# Patient Record
Sex: MALE | Race: WHITE | NOT HISPANIC OR LATINO | URBAN - METROPOLITAN AREA
[De-identification: names, ages, dates, MRNs, and addresses within clinical notes are randomized per-mention and may not be internally consistent; named-entity substitution may affect disease eponyms.]

---

## 2021-05-05 ENCOUNTER — OUTPATIENT (OUTPATIENT)
Dept: OUTPATIENT SERVICES | Facility: HOSPITAL | Age: 61
LOS: 1 days | Discharge: HOME | End: 2021-05-05
Payer: MEDICAID

## 2021-05-05 DIAGNOSIS — R51.9 HEADACHE, UNSPECIFIED: ICD-10-CM

## 2021-05-05 DIAGNOSIS — M54.2 CERVICALGIA: ICD-10-CM

## 2021-05-05 PROCEDURE — 72148 MRI LUMBAR SPINE W/O DYE: CPT | Mod: 26

## 2021-05-05 PROCEDURE — 70551 MRI BRAIN STEM W/O DYE: CPT | Mod: 26

## 2022-01-25 ENCOUNTER — OUTPATIENT (OUTPATIENT)
Dept: OUTPATIENT SERVICES | Facility: HOSPITAL | Age: 62
LOS: 1 days | Discharge: HOME | End: 2022-01-25
Payer: MEDICAID

## 2022-01-25 ENCOUNTER — RESULT REVIEW (OUTPATIENT)
Age: 62
End: 2022-01-25

## 2022-01-25 VITALS
OXYGEN SATURATION: 99 % | WEIGHT: 180.56 LBS | SYSTOLIC BLOOD PRESSURE: 118 MMHG | DIASTOLIC BLOOD PRESSURE: 78 MMHG | HEART RATE: 74 BPM | RESPIRATION RATE: 15 BRPM | HEIGHT: 66 IN | TEMPERATURE: 98 F

## 2022-01-25 DIAGNOSIS — M16.12 UNILATERAL PRIMARY OSTEOARTHRITIS, LEFT HIP: ICD-10-CM

## 2022-01-25 DIAGNOSIS — Z01.818 ENCOUNTER FOR OTHER PREPROCEDURAL EXAMINATION: ICD-10-CM

## 2022-01-25 LAB
A1C WITH ESTIMATED AVERAGE GLUCOSE RESULT: 6.3 % — HIGH (ref 4–5.6)
ALBUMIN SERPL ELPH-MCNC: 4.7 G/DL — SIGNIFICANT CHANGE UP (ref 3.5–5.2)
ALP SERPL-CCNC: 79 U/L — SIGNIFICANT CHANGE UP (ref 30–115)
ALT FLD-CCNC: 29 U/L — SIGNIFICANT CHANGE UP (ref 0–41)
ANION GAP SERPL CALC-SCNC: 12 MMOL/L — SIGNIFICANT CHANGE UP (ref 7–14)
APTT BLD: 34.4 SEC — SIGNIFICANT CHANGE UP (ref 27–39.2)
AST SERPL-CCNC: 25 U/L — SIGNIFICANT CHANGE UP (ref 0–41)
BASOPHILS # BLD AUTO: 0.04 K/UL — SIGNIFICANT CHANGE UP (ref 0–0.2)
BASOPHILS NFR BLD AUTO: 0.8 % — SIGNIFICANT CHANGE UP (ref 0–1)
BILIRUB SERPL-MCNC: 0.5 MG/DL — SIGNIFICANT CHANGE UP (ref 0.2–1.2)
BLD GP AB SCN SERPL QL: SIGNIFICANT CHANGE UP
BUN SERPL-MCNC: 17 MG/DL — SIGNIFICANT CHANGE UP (ref 10–20)
CALCIUM SERPL-MCNC: 9.4 MG/DL — SIGNIFICANT CHANGE UP (ref 8.5–10.1)
CHLORIDE SERPL-SCNC: 105 MMOL/L — SIGNIFICANT CHANGE UP (ref 98–110)
CO2 SERPL-SCNC: 23 MMOL/L — SIGNIFICANT CHANGE UP (ref 17–32)
CREAT SERPL-MCNC: 0.9 MG/DL — SIGNIFICANT CHANGE UP (ref 0.7–1.5)
EOSINOPHIL # BLD AUTO: 0.14 K/UL — SIGNIFICANT CHANGE UP (ref 0–0.7)
EOSINOPHIL NFR BLD AUTO: 3 % — SIGNIFICANT CHANGE UP (ref 0–8)
ESTIMATED AVERAGE GLUCOSE: 134 MG/DL — HIGH (ref 68–114)
GLUCOSE SERPL-MCNC: 116 MG/DL — HIGH (ref 70–99)
HCT VFR BLD CALC: 47.5 % — SIGNIFICANT CHANGE UP (ref 42–52)
HGB BLD-MCNC: 16 G/DL — SIGNIFICANT CHANGE UP (ref 14–18)
IMM GRANULOCYTES NFR BLD AUTO: 0.2 % — SIGNIFICANT CHANGE UP (ref 0.1–0.3)
INR BLD: 0.95 RATIO — SIGNIFICANT CHANGE UP (ref 0.65–1.3)
LYMPHOCYTES # BLD AUTO: 1.47 K/UL — SIGNIFICANT CHANGE UP (ref 1.2–3.4)
LYMPHOCYTES # BLD AUTO: 31.1 % — SIGNIFICANT CHANGE UP (ref 20.5–51.1)
MCHC RBC-ENTMCNC: 29.9 PG — SIGNIFICANT CHANGE UP (ref 27–31)
MCHC RBC-ENTMCNC: 33.7 G/DL — SIGNIFICANT CHANGE UP (ref 32–37)
MCV RBC AUTO: 88.8 FL — SIGNIFICANT CHANGE UP (ref 80–94)
MONOCYTES # BLD AUTO: 0.39 K/UL — SIGNIFICANT CHANGE UP (ref 0.1–0.6)
MONOCYTES NFR BLD AUTO: 8.2 % — SIGNIFICANT CHANGE UP (ref 1.7–9.3)
MRSA PCR RESULT.: NEGATIVE — SIGNIFICANT CHANGE UP
NEUTROPHILS # BLD AUTO: 2.68 K/UL — SIGNIFICANT CHANGE UP (ref 1.4–6.5)
NEUTROPHILS NFR BLD AUTO: 56.7 % — SIGNIFICANT CHANGE UP (ref 42.2–75.2)
NRBC # BLD: 0 /100 WBCS — SIGNIFICANT CHANGE UP (ref 0–0)
PLATELET # BLD AUTO: 230 K/UL — SIGNIFICANT CHANGE UP (ref 130–400)
POTASSIUM SERPL-MCNC: 4.8 MMOL/L — SIGNIFICANT CHANGE UP (ref 3.5–5)
POTASSIUM SERPL-SCNC: 4.8 MMOL/L — SIGNIFICANT CHANGE UP (ref 3.5–5)
PROT SERPL-MCNC: 7.6 G/DL — SIGNIFICANT CHANGE UP (ref 6–8)
PROTHROM AB SERPL-ACNC: 10.9 SEC — SIGNIFICANT CHANGE UP (ref 9.95–12.87)
RBC # BLD: 5.35 M/UL — SIGNIFICANT CHANGE UP (ref 4.7–6.1)
RBC # FLD: 13.1 % — SIGNIFICANT CHANGE UP (ref 11.5–14.5)
SODIUM SERPL-SCNC: 140 MMOL/L — SIGNIFICANT CHANGE UP (ref 135–146)
WBC # BLD: 4.73 K/UL — LOW (ref 4.8–10.8)
WBC # FLD AUTO: 4.73 K/UL — LOW (ref 4.8–10.8)

## 2022-01-25 PROCEDURE — 93010 ELECTROCARDIOGRAM REPORT: CPT

## 2022-01-25 PROCEDURE — 71046 X-RAY EXAM CHEST 2 VIEWS: CPT | Mod: 26

## 2022-01-25 PROCEDURE — 73502 X-RAY EXAM HIP UNI 2-3 VIEWS: CPT | Mod: 26,LT

## 2022-01-25 NOTE — H&P PST ADULT - HISTORY OF PRESENT ILLNESS
PT PRESENTS TO PAST WITH NO SOB, CP, PALPITATIONS, DYSURIA, UTI OR URI AT PRESENT.   PT ABLE TO WALK UP 2-3 FLIGHTS OF STEPS WITH NO SOB-VERY SLOW.   AS PER THE PT, THIS IS HIS/HER COMPLETE MEDICAL AND SURGICAL HX, INCLUDING MEDICATIONS PRESCRIBED AND OVER THE COUNTER  pt denies any covid s/s, YES- tested positive for covid  past- 12/21.     PT IS AWARE OF DATE AND TIME OF COVID TESTING PRIOR TO SURGERY.   pt advised self quarantine till day of procedure  denies travel outside the [place] in the past 30 days  Anesthesia Alert  NO--Difficult Airway  NO--History of neck surgery or radiation  NO--Limited ROM of neck  NO--History of Malignant hyperthermia  NO--Personal or family history of Pseudocholinesterase deficiency  NO--Prior Anesthesia Complication  NO--Latex Allergy  NO--Loose teeth  NO--History of Rheumatoid Arthritis  NO--THAD  NO BLEEDING RISK  NO--Other_____

## 2022-01-25 NOTE — H&P PST ADULT - REASON FOR ADMISSION
Patient is a  61  year old     male presenting to PAST in preparation for   left total hip replacement  on  2/11/22    under  regional anesthesia by Dr. Bunny arriaga.  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 THE PAIN HAS BEEN INCREASING RECENTLY.    THE PAIN IS A 7/10 IN MY LEFT HIP.  THE PAIN IS A BURNING, THROBBING AND RADIATING PAIN. THE PAIN RADIATES AROUND MY LEFT HIP.  I HAVE TO REST FOR THE PAIN TO GO AWAY.

## 2022-02-11 ENCOUNTER — INPATIENT (INPATIENT)
Facility: HOSPITAL | Age: 62
LOS: 0 days | Discharge: HOME HEALTH | End: 2022-02-12
Attending: ORTHOPAEDIC SURGERY | Admitting: ORTHOPAEDIC SURGERY
Payer: MEDICAID

## 2022-02-11 ENCOUNTER — RESULT REVIEW (OUTPATIENT)
Age: 62
End: 2022-02-11

## 2022-02-11 ENCOUNTER — TRANSCRIPTION ENCOUNTER (OUTPATIENT)
Age: 62
End: 2022-02-11

## 2022-02-11 VITALS
HEIGHT: 66 IN | SYSTOLIC BLOOD PRESSURE: 153 MMHG | TEMPERATURE: 98 F | HEART RATE: 80 BPM | OXYGEN SATURATION: 96 % | RESPIRATION RATE: 17 BRPM | DIASTOLIC BLOOD PRESSURE: 96 MMHG | WEIGHT: 179.9 LBS

## 2022-02-11 LAB
GLUCOSE BLDC GLUCOMTR-MCNC: 121 MG/DL — HIGH (ref 70–99)
GLUCOSE BLDC GLUCOMTR-MCNC: 99 MG/DL — SIGNIFICANT CHANGE UP (ref 70–99)

## 2022-02-11 PROCEDURE — 88311 DECALCIFY TISSUE: CPT | Mod: 26

## 2022-02-11 PROCEDURE — 88304 TISSUE EXAM BY PATHOLOGIST: CPT | Mod: 26

## 2022-02-11 PROCEDURE — 73501 X-RAY EXAM HIP UNI 1 VIEW: CPT | Mod: 26,LT

## 2022-02-11 RX ORDER — PANTOPRAZOLE SODIUM 20 MG/1
40 TABLET, DELAYED RELEASE ORAL
Refills: 0 | Status: DISCONTINUED | OUTPATIENT
Start: 2022-02-11 | End: 2022-02-12

## 2022-02-11 RX ORDER — ASPIRIN/CALCIUM CARB/MAGNESIUM 324 MG
81 TABLET ORAL EVERY 12 HOURS
Refills: 0 | Status: DISCONTINUED | OUTPATIENT
Start: 2022-02-11 | End: 2022-02-12

## 2022-02-11 RX ORDER — ACETAMINOPHEN 500 MG
2 TABLET ORAL
Qty: 96 | Refills: 0
Start: 2022-02-11 | End: 2022-02-22

## 2022-02-11 RX ORDER — PANTOPRAZOLE SODIUM 20 MG/1
1 TABLET, DELAYED RELEASE ORAL
Qty: 30 | Refills: 0
Start: 2022-02-11 | End: 2022-03-12

## 2022-02-11 RX ORDER — TRAMADOL HYDROCHLORIDE 50 MG/1
1 TABLET ORAL
Qty: 20 | Refills: 0
Start: 2022-02-11 | End: 2022-02-15

## 2022-02-11 RX ORDER — CELECOXIB 200 MG/1
1 CAPSULE ORAL
Qty: 28 | Refills: 0
Start: 2022-02-11 | End: 2022-02-24

## 2022-02-11 RX ORDER — ONDANSETRON 8 MG/1
4 TABLET, FILM COATED ORAL ONCE
Refills: 0 | Status: DISCONTINUED | OUTPATIENT
Start: 2022-02-11 | End: 2022-02-11

## 2022-02-11 RX ORDER — CELECOXIB 200 MG/1
400 CAPSULE ORAL ONCE
Refills: 0 | Status: COMPLETED | OUTPATIENT
Start: 2022-02-11 | End: 2022-02-11

## 2022-02-11 RX ORDER — OXYCODONE HYDROCHLORIDE 5 MG/1
1 TABLET ORAL
Qty: 12 | Refills: 0
Start: 2022-02-11 | End: 2022-02-14

## 2022-02-11 RX ORDER — SODIUM CHLORIDE 9 MG/ML
1000 INJECTION INTRAMUSCULAR; INTRAVENOUS; SUBCUTANEOUS
Refills: 0 | Status: DISCONTINUED | OUTPATIENT
Start: 2022-02-12 | End: 2022-02-12

## 2022-02-11 RX ORDER — POLYETHYLENE GLYCOL 3350 17 G/17G
17 POWDER, FOR SOLUTION ORAL AT BEDTIME
Refills: 0 | Status: DISCONTINUED | OUTPATIENT
Start: 2022-02-11 | End: 2022-02-12

## 2022-02-11 RX ORDER — HYDROMORPHONE HYDROCHLORIDE 2 MG/ML
0.5 INJECTION INTRAMUSCULAR; INTRAVENOUS; SUBCUTANEOUS
Refills: 0 | Status: DISCONTINUED | OUTPATIENT
Start: 2022-02-11 | End: 2022-02-11

## 2022-02-11 RX ORDER — SENNA PLUS 8.6 MG/1
2 TABLET ORAL AT BEDTIME
Refills: 0 | Status: DISCONTINUED | OUTPATIENT
Start: 2022-02-11 | End: 2022-02-12

## 2022-02-11 RX ORDER — SODIUM CHLORIDE 9 MG/ML
1000 INJECTION, SOLUTION INTRAVENOUS
Refills: 0 | Status: DISCONTINUED | OUTPATIENT
Start: 2022-02-11 | End: 2022-02-11

## 2022-02-11 RX ORDER — ASPIRIN/CALCIUM CARB/MAGNESIUM 324 MG
1 TABLET ORAL
Qty: 60 | Refills: 0
Start: 2022-02-11 | End: 2022-03-12

## 2022-02-11 RX ORDER — TRAMADOL HYDROCHLORIDE 50 MG/1
1 TABLET ORAL
Qty: 24 | Refills: 0
Start: 2022-02-11 | End: 2022-02-16

## 2022-02-11 RX ORDER — CEFAZOLIN SODIUM 1 G
2000 VIAL (EA) INJECTION EVERY 8 HOURS
Refills: 0 | Status: COMPLETED | OUTPATIENT
Start: 2022-02-11 | End: 2022-02-12

## 2022-02-11 RX ORDER — TRAMADOL HYDROCHLORIDE 50 MG/1
50 TABLET ORAL EVERY 6 HOURS
Refills: 0 | Status: DISCONTINUED | OUTPATIENT
Start: 2022-02-11 | End: 2022-02-12

## 2022-02-11 RX ORDER — ACETAMINOPHEN 500 MG
1000 TABLET ORAL ONCE
Refills: 0 | Status: COMPLETED | OUTPATIENT
Start: 2022-02-11 | End: 2022-02-11

## 2022-02-11 RX ORDER — KETOROLAC TROMETHAMINE 30 MG/ML
15 SYRINGE (ML) INJECTION EVERY 6 HOURS
Refills: 0 | Status: DISCONTINUED | OUTPATIENT
Start: 2022-02-11 | End: 2022-02-12

## 2022-02-11 RX ORDER — ONDANSETRON 8 MG/1
4 TABLET, FILM COATED ORAL EVERY 6 HOURS
Refills: 0 | Status: DISCONTINUED | OUTPATIENT
Start: 2022-02-11 | End: 2022-02-12

## 2022-02-11 RX ORDER — OXYCODONE HYDROCHLORIDE 5 MG/1
5 TABLET ORAL ONCE
Refills: 0 | Status: DISCONTINUED | OUTPATIENT
Start: 2022-02-11 | End: 2022-02-11

## 2022-02-11 RX ORDER — ACETAMINOPHEN 500 MG
650 TABLET ORAL EVERY 6 HOURS
Refills: 0 | Status: DISCONTINUED | OUTPATIENT
Start: 2022-02-11 | End: 2022-02-12

## 2022-02-11 RX ORDER — CELECOXIB 200 MG/1
200 CAPSULE ORAL EVERY 12 HOURS
Refills: 0 | Status: DISCONTINUED | OUTPATIENT
Start: 2022-02-12 | End: 2022-02-12

## 2022-02-11 RX ORDER — DEXAMETHASONE 0.5 MG/5ML
2 ELIXIR ORAL ONCE
Refills: 0 | Status: COMPLETED | OUTPATIENT
Start: 2022-02-12 | End: 2022-02-12

## 2022-02-11 RX ORDER — MAGNESIUM HYDROXIDE 400 MG/1
30 TABLET, CHEWABLE ORAL DAILY
Refills: 0 | Status: DISCONTINUED | OUTPATIENT
Start: 2022-02-11 | End: 2022-02-12

## 2022-02-11 RX ADMIN — Medication 15 MILLIGRAM(S): at 21:32

## 2022-02-11 RX ADMIN — Medication 100 MILLIGRAM(S): at 21:31

## 2022-02-11 RX ADMIN — SENNA PLUS 2 TABLET(S): 8.6 TABLET ORAL at 21:33

## 2022-02-11 RX ADMIN — SODIUM CHLORIDE 100 MILLILITER(S): 9 INJECTION, SOLUTION INTRAVENOUS at 18:21

## 2022-02-11 RX ADMIN — Medication 650 MILLIGRAM(S): at 21:33

## 2022-02-11 RX ADMIN — Medication 1000 MILLIGRAM(S): at 14:45

## 2022-02-11 RX ADMIN — Medication 15 MILLIGRAM(S): at 22:32

## 2022-02-11 RX ADMIN — CELECOXIB 400 MILLIGRAM(S): 200 CAPSULE ORAL at 14:34

## 2022-02-11 RX ADMIN — POLYETHYLENE GLYCOL 3350 17 GRAM(S): 17 POWDER, FOR SOLUTION ORAL at 21:33

## 2022-02-11 RX ADMIN — Medication 650 MILLIGRAM(S): at 22:32

## 2022-02-11 RX ADMIN — CELECOXIB 400 MILLIGRAM(S): 200 CAPSULE ORAL at 14:45

## 2022-02-11 RX ADMIN — Medication 1000 MILLIGRAM(S): at 14:34

## 2022-02-11 NOTE — DISCHARGE NOTE PROVIDER - NSDCCPGOAL_GEN_ALL_CORE_FT
To get better and follow your care plan as instructed. PT/OT, wbat, continue aspirin 81 mg bid x 30 days for dvt ppx, continue protonix for GI ppx, keep postsurgical dressing 1 week, may shower, f/u as OP with dr Arriaga in 3 weeks

## 2022-02-11 NOTE — ANESTHESIA FOLLOW-UP NOTE - NSRECOMMEND_GEN_ALL_CORE
10/18/2019         RE: Kareem Bai  [address]  [facility] [address]        Dear Colleague,    Thank you for referring your patient, Kareem Bai, to the [facility]. Please see a copy of my visit note below.    Consultation for 63 year old man who has experienced numbness in feet and lower limbs, progressing rostrally, for about 15 years. Numbness in hands upon awakening only. He denies weakness or falls.     PMH, medications, social and family history, and ROS are documented in Dr Solorzano's note and in today's new patient packet.     Denies sicca or history of hepatitis C.     Examination    Mental state: Alert, appropriate, speech, language, and thought content normal.     Cranial nerves II-XII normal.    Sensory examination:   Right Left   Light touch Normal Normal   Vibration (timed)     Vibration (Rydell-Seiffer) MM4 MM3   Pin PC DC   Position     Legend:   MM = medial malleolus, TT = tibial tuberosity, K = patella, MCP = MCP joint  MF = mid-foot, DC = distal calf, MC = mid calf, PC = proximal calf      Manual muscle testing (A indicates atrophy):   Right Left   Shoulder abduction  5 5   Elbow extension 5 5   Elbow flexion 5 5   Wrist extension  5 5   Finger extension 5 5   FDI 5 5   APB 5 5   Hip flexion 5 5   Knee flexion 5 5   Knee extension 5 5   Dorsiflexion 5 5   Plantar flexion 5 5     Muscle tone:   Right Left   Upper limb Normal Normal   Lower limb Normal Normal        Reflexes:   Right Left   Triceps 2 2   Biceps 2 2   Brachioradialis 2 2   Patellar 0 0   Achilles 0 0   Plantar Flexor Flexor   Clonus Absent Absent      Coordination:  Finger-nose normal.  Heel-shin normal.  RRMs normal.    Gait:  Normal. Heel, toe walking normal.     Impression:  1. Reduced pin perception in lower limbs with relative preservation of large fiber modalities.   2. Intrinsic foot muscle atrophy, possibly due to prior spinal stenosis or genetically determined.  "      Recommendations (from AVS):      1. You may have a \"small fiber neuropathy.\" This does not show up on the testing you have had so far. We will test for it with skin biopsies of the foot and calf.  2. I do not think this problem will substantially limit your ability to walk. It may get gradually worse over the years but at worst affects balance modestly and does not cause weakness.  3. Some of the foot muscles are smaller than normal. This could possibly be related to the spinal stenosis or could be hereditary. I do not think we need to do anything about it unless you develop recurrent symptoms similar to those that led to your spinal fusion.   4. Blood test today to screen further for diabetes.      Joaquin Garcia M.D.      Again, thank you for allowing me to participate in the care of your patient.        Sincerely,        Joaquin Garcia MD    " None

## 2022-02-11 NOTE — DISCHARGE NOTE PROVIDER - NSDCACTIVITY_GEN_ALL_CORE
Follow Instructions Provided by your Surgical Team Do not drive or operate machinery/Showering allowed/No heavy lifting/straining/Follow Instructions Provided by your Surgical Team

## 2022-02-11 NOTE — PATIENT PROFILE ADULT - FALL HARM RISK - HARM RISK INTERVENTIONS

## 2022-02-11 NOTE — DISCHARGE NOTE PROVIDER - HOSPITAL COURSE
61 y o M underwent left HENRY, Pt tolerated procedure well. Pt was given intraop and postop abx for infection ppx, pain control meds, DVT/GI ppx. Pt worked wit PT/OT in hospital, cleared for discharge home with home care services.

## 2022-02-11 NOTE — DISCHARGE NOTE PROVIDER - PROVIDER RX CONTACT NUMBER
Received pt PAP meds today . Called and notified pt her PAP meds and pen needles were available to be picked up at the clinic. [phone]

## 2022-02-11 NOTE — DISCHARGE NOTE PROVIDER - CARE PROVIDER_API CALL
Andrea Dumas)  Orthopaedic Surgery  [address] [place] [address]  Phone: [phone]  Fax: [phone]  Follow Up Time:    Andrea Dumas)  Orthopaedic Surgery  [address] [place] [address]  Phone: [phone]  Fax: [phone]  Follow Up Time: 2 weeks

## 2022-02-11 NOTE — CHART NOTE - NSCHARTNOTEFT_GEN_A_CORE
PACU ANESTHESIA ADMISSION NOTE      Procedure:   Post op diagnosis:      ____  Intubated  TV:______       Rate: ______      FiO2: ______    _x__  Patent Airway    __x__  Full return of protective reflexes    _x___  Full recovery from anesthesia / back to baseline     Vitals:   T:  97.7         R:   18               BP:   120/70               Sat:     94              P: 79      Mental Status:  __x__ Awake   __x___ Alert   _____ Drowsy   _____ Sedated    Nausea/Vomiting:  _x___ NO  ______Yes,   See Post - Op Orders          Pain Scale (0-10):  __0___    Treatment: ____ None    ____ See Post - Op/PCA Orders    Post - Operative Fluids:   ____ Oral   __x__ See Post - Op Orders    Plan: Discharge:   ____Home       __x___Floor     _____Critical Care    _____  Other:_________________    Comments:  dc to floor when meets criteria

## 2022-02-11 NOTE — DISCHARGE NOTE PROVIDER - NSDCCPCAREPLAN_GEN_ALL_CORE_FT
PRINCIPAL DISCHARGE DIAGNOSIS  Diagnosis: Status post total hip replacement, left  Assessment and Plan of Treatment:

## 2022-02-11 NOTE — ASU PREOP CHECKLIST - PATIENT SENT TO
-Patient with delayed responses. oriented x 3 but has difficulty remembering things such as medications, PMHx. Likely 2/2 Xanax vs hyponatremia, dehydration.  -No e/o infection aside from leukocytosis (normal diff, downtrending).   -B12, TSH, RPR  -avoid sedating medications operating room

## 2022-02-11 NOTE — ASU PATIENT PROFILE, ADULT - FALL HARM RISK - UNIVERSAL INTERVENTIONS
Bed in lowest position, wheels locked, appropriate side rails in place/Call bell, personal items and telephone in reach/Instruct patient to call for assistance before getting out of bed or chair/Non-slip footwear when patient is out of bed/Ethel to call system/Physically safe environment - no spills, clutter or unnecessary equipment/Purposeful Proactive Rounding/Room/bathroom lighting operational, light cord in reach

## 2022-02-11 NOTE — DISCHARGE NOTE PROVIDER - NSDCMRMEDTOKEN_GEN_ALL_CORE_FT
acetaminophen 325 mg oral tablet: 2 tab(s) orally every 6 hours MDD:8  aspirin 81 mg oral delayed release tablet: 1 tab(s) orally every 12 hours MDD:2  celecoxib 200 mg oral capsule: 1 cap(s) orally every 12 hours MDD:2  oxyCODONE 5 mg oral tablet: 1 tab(s) orally every 8 hours, As Needed -breakthrough  Pain (4 - 6) MDD:3  pantoprazole 40 mg oral delayed release tablet: 1 tab(s) orally once a day (before a meal) MDD:1  traMADol 50 mg oral tablet: 1 tab(s) orally every 6 hours, As needed, severe Pain (1 - 3) MDD:4   acetaminophen 325 mg oral tablet: 2 tab(s) orally every 6 hours MDD:8  aspirin 81 mg oral delayed release tablet: 1 tab(s) orally every 12 hours  celecoxib 200 mg oral capsule: 1 cap(s) orally every 12 hours  pantoprazole 40 mg oral delayed release tablet: 1 tab(s) orally once a day (before a meal)  traMADol 50 mg oral tablet: 1 tab(s) orally every 6 hours, As needed,severe Pain (1 - 3) MDD:4

## 2022-02-12 ENCOUNTER — TRANSCRIPTION ENCOUNTER (OUTPATIENT)
Age: 62
End: 2022-02-12

## 2022-02-12 VITALS — DIASTOLIC BLOOD PRESSURE: 74 MMHG | SYSTOLIC BLOOD PRESSURE: 124 MMHG | TEMPERATURE: 98 F | HEART RATE: 78 BPM

## 2022-02-12 LAB
ANION GAP SERPL CALC-SCNC: 12 MMOL/L — SIGNIFICANT CHANGE UP (ref 7–14)
BUN SERPL-MCNC: 25 MG/DL — HIGH (ref 10–20)
CALCIUM SERPL-MCNC: 9.1 MG/DL — SIGNIFICANT CHANGE UP (ref 8.5–10.1)
CHLORIDE SERPL-SCNC: 103 MMOL/L — SIGNIFICANT CHANGE UP (ref 98–110)
CO2 SERPL-SCNC: 20 MMOL/L — SIGNIFICANT CHANGE UP (ref 17–32)
CREAT SERPL-MCNC: 1 MG/DL — SIGNIFICANT CHANGE UP (ref 0.7–1.5)
GLUCOSE SERPL-MCNC: 106 MG/DL — HIGH (ref 70–99)
HCT VFR BLD CALC: 40.3 % — LOW (ref 42–52)
HGB BLD-MCNC: 13.9 G/DL — LOW (ref 14–18)
MCHC RBC-ENTMCNC: 30.1 PG — SIGNIFICANT CHANGE UP (ref 27–31)
MCHC RBC-ENTMCNC: 34.5 G/DL — SIGNIFICANT CHANGE UP (ref 32–37)
MCV RBC AUTO: 87.2 FL — SIGNIFICANT CHANGE UP (ref 80–94)
NRBC # BLD: 0 /100 WBCS — SIGNIFICANT CHANGE UP (ref 0–0)
PLATELET # BLD AUTO: 213 K/UL — SIGNIFICANT CHANGE UP (ref 130–400)
POTASSIUM SERPL-MCNC: 4.6 MMOL/L — SIGNIFICANT CHANGE UP (ref 3.5–5)
POTASSIUM SERPL-SCNC: 4.6 MMOL/L — SIGNIFICANT CHANGE UP (ref 3.5–5)
RBC # BLD: 4.62 M/UL — LOW (ref 4.7–6.1)
RBC # FLD: 12.8 % — SIGNIFICANT CHANGE UP (ref 11.5–14.5)
SODIUM SERPL-SCNC: 135 MMOL/L — SIGNIFICANT CHANGE UP (ref 135–146)
WBC # BLD: 13.94 K/UL — HIGH (ref 4.8–10.8)
WBC # FLD AUTO: 13.94 K/UL — HIGH (ref 4.8–10.8)

## 2022-02-12 RX ORDER — TRAMADOL HYDROCHLORIDE 50 MG/1
1 TABLET ORAL
Qty: 24 | Refills: 0
Start: 2022-02-12 | End: 2022-02-17

## 2022-02-12 RX ORDER — CELECOXIB 200 MG/1
1 CAPSULE ORAL
Qty: 28 | Refills: 0
Start: 2022-02-12 | End: 2022-02-25

## 2022-02-12 RX ORDER — ASPIRIN/CALCIUM CARB/MAGNESIUM 324 MG
1 TABLET ORAL
Qty: 60 | Refills: 0
Start: 2022-02-12 | End: 2022-03-13

## 2022-02-12 RX ORDER — ACETAMINOPHEN 500 MG
1 TABLET ORAL
Qty: 48 | Refills: 0
Start: 2022-02-12 | End: 2022-02-23

## 2022-02-12 RX ORDER — PANTOPRAZOLE SODIUM 20 MG/1
1 TABLET, DELAYED RELEASE ORAL
Qty: 30 | Refills: 0
Start: 2022-02-12 | End: 2022-03-13

## 2022-02-12 RX ADMIN — TRAMADOL HYDROCHLORIDE 50 MILLIGRAM(S): 50 TABLET ORAL at 06:58

## 2022-02-12 RX ADMIN — Medication 100 MILLIGRAM(S): at 06:40

## 2022-02-12 RX ADMIN — Medication 15 MILLIGRAM(S): at 06:58

## 2022-02-12 RX ADMIN — Medication 650 MILLIGRAM(S): at 06:38

## 2022-02-12 RX ADMIN — TRAMADOL HYDROCHLORIDE 50 MILLIGRAM(S): 50 TABLET ORAL at 06:49

## 2022-02-12 RX ADMIN — Medication 2 MILLIGRAM(S): at 06:39

## 2022-02-12 RX ADMIN — Medication 650 MILLIGRAM(S): at 11:30

## 2022-02-12 RX ADMIN — Medication 15 MILLIGRAM(S): at 11:30

## 2022-02-12 RX ADMIN — Medication 81 MILLIGRAM(S): at 06:40

## 2022-02-12 RX ADMIN — Medication 650 MILLIGRAM(S): at 06:58

## 2022-02-12 RX ADMIN — Medication 15 MILLIGRAM(S): at 06:38

## 2022-02-12 RX ADMIN — PANTOPRAZOLE SODIUM 40 MILLIGRAM(S): 20 TABLET, DELAYED RELEASE ORAL at 06:39

## 2022-02-12 RX ADMIN — SODIUM CHLORIDE 100 MILLILITER(S): 9 INJECTION INTRAMUSCULAR; INTRAVENOUS; SUBCUTANEOUS at 06:40

## 2022-02-12 NOTE — PHYSICAL THERAPY INITIAL EVALUATION ADULT - GENERAL OBSERVATIONS, REHAB EVAL
08:25-08:50 Chart reviewed. Pt encountered sitting in chair, may be seen by Physical Therapist as confirmed with Nurse. Patient denied unusual pain at rest and ready for therapy now; +Aquacel (L)hip/compression socks/heplock RUE

## 2022-02-12 NOTE — DISCHARGE NOTE NURSING/CASE MANAGEMENT/SOCIAL WORK - NSDCPEFALRISK_GEN_ALL_CORE
For information on Fall & Injury Prevention, visit: [email or web address] OR  [email or web address] OR  https://www.cdc.gov/steadi/patient.html

## 2022-02-12 NOTE — OCCUPATIONAL THERAPY INITIAL EVALUATION ADULT - GENERAL OBSERVATIONS, REHAB EVAL
09:00-09:25 chart reviewed, ok to treat by Occupational Therapist as confirmed by RN Chani, Pt received standing in room in NAD. Pt in agreement with OT IE.

## 2022-02-12 NOTE — PROGRESS NOTE ADULT - SUBJECTIVE AND OBJECTIVE BOX
s/p ravinder  doing very well  minimal pain  discussed instructions patient and family over the phone  may DC home and f/u as OP.

## 2022-02-12 NOTE — PHYSICAL THERAPY INITIAL EVALUATION ADULT - ADDITIONAL COMMENTS
Per patient via  #[id number] (Jeremiah),, he lives with spouse and daughter in private home with 3 steps with 2 rails outside and 2 steps with (R) rail going up inside; was independent with ambulation but would use crutches as needed

## 2022-02-12 NOTE — OCCUPATIONAL THERAPY INITIAL EVALUATION ADULT - LIVES WITH, PROFILE
wife and daughter in a 2 family home. Patient and spouse live on first level. 3+2 steps to enter. +walk in shower/children/spouse

## 2022-02-12 NOTE — PHYSICAL THERAPY INITIAL EVALUATION ADULT - DISCHARGE DISPOSITION, PT EVAL
Clear bilaterally, pupils equal, round and reactive to light. Clear bilaterally, pupils equal, round and reactive to light. home w/ home PT

## 2022-02-12 NOTE — PHYSICAL THERAPY INITIAL EVALUATION ADULT - PERTINENT HX OF CURRENT PROBLEM, REHAB EVAL
62 y/o male admitted for (L) HENRY for arthritis (L) hip. performed under regional anesthesia on 2/11/22

## 2022-02-12 NOTE — DISCHARGE NOTE NURSING/CASE MANAGEMENT/SOCIAL WORK - PATIENT PORTAL LINK FT
You can access the FollowMyHealth Patient Portal offered by [facility] by registering at the following website: [email or web address]. By joining [employer]’s FollowMyHealth portal, you will also be able to view your health information using other applications (apps) compatible with our system.

## 2022-02-12 NOTE — PHYSICAL THERAPY INITIAL EVALUATION ADULT - MANUAL MUSCLE TESTING RESULTS, REHAB EVAL
RLE ms strength graded 3+ to 4-/5; (L) hip 3-/5; (L) knee/ankle 3 to 3+/5; Please refer to OT chandana for BUE

## 2022-02-12 NOTE — CHART NOTE - NSCHARTNOTEFT_GEN_A_CORE
.  Patient's discharge Rx's would not go through electronically after multiple attempts.      I discussed with the pharmacist at [facility] and she could not figure out why so she said to phone in all non-narcotic medications and give a paper Rx for the Tramadol.    I phoned in the following:  - Aspirin 81 mg BID x 30 days.  - Protonix 40 mg daily x 30 days.  - Celebrex 200 mg BID x 14 days.  - Tylenol 650 mg q 6 hours x 14 days.    I gave a paper Rx for Tramadol 50 mg q 6 hrs PRN pain x 6 days with 24 tablets.        I explained the above to the patient using the [facility]  line with  Kirstie with ID # [id number] and told him to give the pharmacist the paper Rx when he goes to  the remaining Rx's.    Patient verbalized understanding.

## 2022-02-12 NOTE — PHYSICAL THERAPY INITIAL EVALUATION ADULT - ACTIVE RANGE OF MOTION EXAMINATION, REHAB EVAL
LLE joints required AAROM/AROM with (L) hip flexion 0-90 degrees and (L) hip abduction 0-40 degress in supine; Please refer to OT eval for BUE/Right LE Active ROM was WFL (within functional limits)

## 2022-02-14 PROBLEM — Z00.00 ENCOUNTER FOR PREVENTIVE HEALTH EXAMINATION: Status: ACTIVE | Noted: 2022-02-14

## 2022-02-15 DIAGNOSIS — M16.12 UNILATERAL PRIMARY OSTEOARTHRITIS, LEFT HIP: ICD-10-CM

## 2022-02-16 LAB — SURGICAL PATHOLOGY STUDY: SIGNIFICANT CHANGE UP

## 2023-08-01 NOTE — PATIENT PROFILE ADULT - DATE/TIME OF ACCEPTANCE
12-Feb-2022 06:51 Tranexamic Acid Counseling:  Patient advised of the small risk of bleeding problems with tranexamic acid. They were also instructed to call if they developed any nausea, vomiting or diarrhea. All of the patient's questions and concerns were addressed.

## 2023-09-28 NOTE — DISCHARGE NOTE PROVIDER - NSFTFHOMEHTHYNRD_GEN_ALL_CORE
Patient is accepted at [facility] for wait list  Patient is accepted per Lise Monroy. No bed today, they will call once a bed is available      S/w the patient's mother Ashlee Hsu by phone and updated. She requested for some time to explore the the program.  She will be calling back within the hour. Yes

## 2023-11-14 NOTE — PRE-ANESTHESIA EVALUATION ADULT - NSANTHTOBACCOSD_GEN_ALL_CORE
November 14, 2023     Patient: Corina Hutchinson   YOB: 1971   Date of Visit: 11/14/2023       To Whom it May Concern:    Corina Hutchinson was seen in my clinic on 11/14/2023 at 2:00 pm.    Patient has been diagnosed with Mild Covid -19 infection .please excuse Corina for her absence from work for 5 days    Sincerely,  Brian Rahman MD    Medical information is confidential and cannot be disclosed without the written consent of the patient or her representative.    
Yes